# Patient Record
Sex: FEMALE | Race: BLACK OR AFRICAN AMERICAN | URBAN - METROPOLITAN AREA
[De-identification: names, ages, dates, MRNs, and addresses within clinical notes are randomized per-mention and may not be internally consistent; named-entity substitution may affect disease eponyms.]

---

## 2020-10-02 ENCOUNTER — OFFICE VISIT (OUTPATIENT)
Dept: FAMILY MEDICINE | Facility: CLINIC | Age: 45
End: 2020-10-02
Payer: COMMERCIAL

## 2020-10-02 ENCOUNTER — ANCILLARY PROCEDURE (OUTPATIENT)
Dept: ULTRASOUND IMAGING | Facility: CLINIC | Age: 45
End: 2020-10-02
Attending: PHYSICIAN ASSISTANT
Payer: COMMERCIAL

## 2020-10-02 VITALS
TEMPERATURE: 97.9 F | DIASTOLIC BLOOD PRESSURE: 83 MMHG | HEART RATE: 89 BPM | OXYGEN SATURATION: 100 % | BODY MASS INDEX: 30.24 KG/M2 | SYSTOLIC BLOOD PRESSURE: 125 MMHG | HEIGHT: 59 IN | WEIGHT: 150 LBS

## 2020-10-02 DIAGNOSIS — D64.9 LOW HEMOGLOBIN: ICD-10-CM

## 2020-10-02 DIAGNOSIS — R19.00 PELVIC MASS: ICD-10-CM

## 2020-10-02 DIAGNOSIS — Z12.31 ENCOUNTER FOR SCREENING MAMMOGRAM FOR BREAST CANCER: ICD-10-CM

## 2020-10-02 DIAGNOSIS — Z00.00 ROUTINE GENERAL MEDICAL EXAMINATION AT A HEALTH CARE FACILITY: Primary | ICD-10-CM

## 2020-10-02 DIAGNOSIS — Z71.6 ENCOUNTER FOR SMOKING CESSATION COUNSELING: ICD-10-CM

## 2020-10-02 LAB
ALBUMIN SERPL-MCNC: 3.4 G/DL (ref 3.4–5)
ALP SERPL-CCNC: 84 U/L (ref 40–150)
ALT SERPL W P-5'-P-CCNC: 22 U/L (ref 0–50)
ANION GAP SERPL CALCULATED.3IONS-SCNC: 7 MMOL/L (ref 3–14)
AST SERPL W P-5'-P-CCNC: 14 U/L (ref 0–45)
BILIRUB SERPL-MCNC: 0.4 MG/DL (ref 0.2–1.3)
BUN SERPL-MCNC: 16 MG/DL (ref 7–30)
CALCIUM SERPL-MCNC: 8.7 MG/DL (ref 8.5–10.1)
CHLORIDE SERPL-SCNC: 108 MMOL/L (ref 94–109)
CO2 SERPL-SCNC: 22 MMOL/L (ref 20–32)
CREAT SERPL-MCNC: 0.82 MG/DL (ref 0.52–1.04)
ERYTHROCYTE [DISTWIDTH] IN BLOOD BY AUTOMATED COUNT: 20.8 % (ref 10–15)
FERRITIN SERPL-MCNC: 2 NG/ML (ref 8–252)
FOLATE SERPL-MCNC: 9.5 NG/ML
GFR SERPL CREATININE-BSD FRML MDRD: 86 ML/MIN/{1.73_M2}
GLUCOSE SERPL-MCNC: 75 MG/DL (ref 70–99)
HBA1C MFR BLD: 5.2 % (ref 0–5.6)
HCT VFR BLD AUTO: 28.5 % (ref 35–47)
HGB BLD-MCNC: 7.7 G/DL (ref 11.7–15.7)
MCH RBC QN AUTO: 16.3 PG (ref 26.5–33)
MCHC RBC AUTO-ENTMCNC: 27 G/DL (ref 31.5–36.5)
MCV RBC AUTO: 60 FL (ref 78–100)
PLATELET # BLD AUTO: 419 10E9/L (ref 150–450)
POTASSIUM SERPL-SCNC: 4.2 MMOL/L (ref 3.4–5.3)
PROT SERPL-MCNC: 8 G/DL (ref 6.8–8.8)
RBC # BLD AUTO: 4.72 10E12/L (ref 3.8–5.2)
SODIUM SERPL-SCNC: 137 MMOL/L (ref 133–144)
VIT B12 SERPL-MCNC: 568 PG/ML (ref 193–986)
WBC # BLD AUTO: 8.2 10E9/L (ref 4–11)

## 2020-10-02 PROCEDURE — 82746 ASSAY OF FOLIC ACID SERUM: CPT | Performed by: PHYSICIAN ASSISTANT

## 2020-10-02 PROCEDURE — 99386 PREV VISIT NEW AGE 40-64: CPT | Performed by: PHYSICIAN ASSISTANT

## 2020-10-02 PROCEDURE — 83036 HEMOGLOBIN GLYCOSYLATED A1C: CPT | Performed by: PHYSICIAN ASSISTANT

## 2020-10-02 PROCEDURE — 80053 COMPREHEN METABOLIC PANEL: CPT | Performed by: PHYSICIAN ASSISTANT

## 2020-10-02 PROCEDURE — G0145 SCR C/V CYTO,THINLAYER,RESCR: HCPCS | Performed by: PHYSICIAN ASSISTANT

## 2020-10-02 PROCEDURE — 87624 HPV HI-RISK TYP POOLED RSLT: CPT | Performed by: PHYSICIAN ASSISTANT

## 2020-10-02 PROCEDURE — 82728 ASSAY OF FERRITIN: CPT | Performed by: PHYSICIAN ASSISTANT

## 2020-10-02 PROCEDURE — 36415 COLL VENOUS BLD VENIPUNCTURE: CPT | Performed by: PHYSICIAN ASSISTANT

## 2020-10-02 PROCEDURE — 85027 COMPLETE CBC AUTOMATED: CPT | Performed by: PHYSICIAN ASSISTANT

## 2020-10-02 PROCEDURE — 99214 OFFICE O/P EST MOD 30 MIN: CPT | Mod: 25 | Performed by: PHYSICIAN ASSISTANT

## 2020-10-02 PROCEDURE — 82607 VITAMIN B-12: CPT | Performed by: PHYSICIAN ASSISTANT

## 2020-10-02 ASSESSMENT — ENCOUNTER SYMPTOMS
FEVER: 0
CONSTIPATION: 0
CHILLS: 0
JOINT SWELLING: 0
NERVOUS/ANXIOUS: 1
ARTHRALGIAS: 0
NAUSEA: 0
DIZZINESS: 0
HEMATURIA: 0
HEARTBURN: 0
SHORTNESS OF BREATH: 0
HEADACHES: 0
ABDOMINAL PAIN: 0
DYSURIA: 0
EYE PAIN: 0
FREQUENCY: 0
BREAST MASS: 0
SORE THROAT: 0
DIARRHEA: 0
MYALGIAS: 0
PALPITATIONS: 0
PARESTHESIAS: 0
COUGH: 0
HEMATOCHEZIA: 0
WEAKNESS: 0

## 2020-10-02 ASSESSMENT — MIFFLIN-ST. JEOR: SCORE: 1223.09

## 2020-10-02 NOTE — LETTER
October 14, 2020      Joel Salas  [address] [facility] [address]        Dear Ms.Josue,    We are happy to inform you that your recent Pap smear and Human Papillomavirus (HPV) test results are normal and negative.    It is recommended that you have your next Pap smear and Human Papillomavirus (HPV) test in 5 years. You will also need to return to the clinic every year for an annual wellness visit.    If you have additional questions regarding this result, please contact our office and we will be happy to assist you.      Sincerely,    Your [facility] Care Team

## 2020-10-02 NOTE — PROGRESS NOTES
SUBJECTIVE:   CC: Joel Salas is an 44 year old woman who presents for preventive health visit.     Patient has been advised of split billing requirements and indicates understanding: Yes    Would like to start chantix. Had stopped smoking a number of years ago. Has been smoking since pandemic started due to stress.    Hydrocleansing of colon done recently. The last time she went in - they told her to have a lump in her abdomen evaluated. She does not have pain.    Left ankle medial swelling. No pain.     No sex partners in 3 years.  Still getting periods. Very regular. Has had an abnormal pap smear in the past - has been a number of years.       Today's PHQ-2 Score:   PHQ-2 ( 1999 Pfizer) 10/2/2020   Q1: Little interest or pleasure in doing things 1   Q2: Feeling down, depressed or hopeless 1   PHQ-2 Score 2   Q1: Little interest or pleasure in doing things Several days   Q2: Feeling down, depressed or hopeless Several days   PHQ-2 Score 2       Abuse: Current or Past (Physical, Sexual or Emotional) - No  Do you feel safe in your environment? Yes        Social History     Tobacco Use     Smoking status: Current Every Day Smoker     Types: Cigarettes     Smokeless tobacco: Never Used   Substance Use Topics     Alcohol use: Yes     Comment: 1 drink a week      If you drink alcohol do you typically have >3 drinks per day or >7 drinks per week? No    Alcohol Use 10/2/2020   Prescreen: >3 drinks/day or >7 drinks/week? No   Prescreen: >3 drinks/day or >7 drinks/week? -       Reviewed orders with patient.  Reviewed health maintenance and updated orders accordingly - Yes  BP Readings from Last 3 Encounters:   10/02/20 125/83    Wt Readings from Last 3 Encounters:   10/02/20 68 kg (150 lb)                    Mammogram Screening: Patient under age 50, mutual decision reflected in health maintenance.      Pertinent mammograms are reviewed under the imaging tab.  History of abnormal Pap smear: NO - age 30-65 PAP every 5  "years with negative HPV co-testing recommended     Reviewed and updated as needed this visit by clinical staff  Tobacco  Allergies  Meds  Problems  Med Hx  Surg Hx  Fam Hx  Soc Hx          Reviewed and updated as needed this visit by Provider  Tobacco  Allergies  Meds  Problems  Med Hx  Surg Hx  Fam Hx             Review of Systems   Constitutional: Negative for chills and fever.   HENT: Negative for congestion, ear pain, hearing loss and sore throat.    Eyes: Negative for pain and visual disturbance.   Respiratory: Negative for cough and shortness of breath.    Cardiovascular: Positive for peripheral edema. Negative for chest pain and palpitations.   Gastrointestinal: Negative for abdominal pain, constipation, diarrhea, heartburn, hematochezia and nausea.   Breasts:  Negative for tenderness, breast mass and discharge.   Genitourinary: Negative for dysuria, frequency, genital sores, hematuria, pelvic pain, urgency, vaginal bleeding and vaginal discharge.   Musculoskeletal: Negative for arthralgias, joint swelling and myalgias.   Skin: Negative for rash.   Neurological: Negative for dizziness, weakness, headaches and paresthesias.   Psychiatric/Behavioral: Negative for mood changes. The patient is nervous/anxious.         OBJECTIVE:   /83 (BP Location: Right arm, Patient Position: Sitting, Cuff Size: Adult Regular)   Pulse 89   Temp 97.9  F (36.6  C) (Oral)   Ht 1.486 m (4' 10.5\")   Wt 68 kg (150 lb)   SpO2 100%   BMI 30.82 kg/m    Physical Exam  GENERAL: healthy, alert and no distress  EYES: Eyes grossly normal to inspection, PERRL and conjunctivae and sclerae normal  HENT: ear canals and TM's normal, nose and mouth without ulcers or lesions  NECK: no adenopathy, no asymmetry, masses, or scars and thyroid normal to palpation  RESP: lungs clear to auscultation - no rales, rhonchi or wheezes  BREAST: normal without masses, tenderness or nipple discharge and no palpable axillary masses or " adenopathy  CV: regular rate and rhythm, normal S1 S2, no S3 or S4, no murmur, click or rub, no peripheral edema and peripheral pulses strong  ABDOMEN: soft, nontender, no hepatosplenomegaly, and bowel sounds normal. Large midline pelvic mass noted. Non tender.   (female): normal female external genitalia, normal urethral meatus, vaginal mucosa pink, moist, well rugated.  MS: no gross musculoskeletal defects noted, no edema  SKIN: no suspicious lesions or rashes  NEURO: Normal strength and tone, mentation intact and speech normal  PSYCH: mentation appears normal, affect normal/bright    Diagnostic Test Results:   Labs reviewed in Epic  No results found for this or any previous visit (from the past 24 hour(s)).    ASSESSMENT/PLAN:   1. Routine general medical examination at a health care facility  Well adult. Screening today.  - Comprehensive metabolic panel (BMP + Alb, Alk Phos, ALT, AST, Total. Bili, TP)  - Hemoglobin A1c  - CBC with platelets  - Pap imaged thin layer screen with HPV - recommended age 30 - 65 years (select HPV order below)  - HPV High Risk Types DNA Cervical    2. Encounter for smoking cessation counseling  Would like to stop smoking. She would like to try chantix. Discussed side effects.   - varenicline (CHANTIX [place]) 0.5 MG X 11 & 1 MG X 42 tablet; Take 0.5 mg tab daily for 3 days, THEN 0.5 mg tab twice daily for 4 days, THEN 1 mg twice daily.  Dispense: 53 tablet; Refill: 0    3. Encounter for screening mammogram for breast cancer  Screening.  - *MA Screening Digital Bilateral; Future    4.. Pelvic mass  Patient has abdominal mass - uterine? Would like to evaluate further with US. Patient agrees to this.   - US Pelvic Complete with Transvaginal; Future    5. Low hemoglobin  Critical hemoglobin level of 7.7 discovered before patient left the clinic. Was able to discuss this further with her. She has not had a previous anemia diagnosis that she is aware of. She has no symptoms - but does state  "she likes to eat ice. No bleeding that she is aware of. Will urgently get the US of the pelvic/uterine mass. Will also obtain a few more labs (folate, vit B12, ferritin). Uterine fibroid vs other pelvic abnormality possibly contributing. Discussed critical level with patient and urgency of obtaining necessary imaging and labs to determine cause. She will proceed to the ED if she develops additional symptoms including lightheadedness, change in vaginal bleeding, shortness of breath, new severe abdominal pain. She verbalizes understanding.    Patient has been advised of split billing requirements and indicates understanding: Yes  COUNSELING:  Special attention given to:        Regular exercise       Healthy diet/nutrition       Safe sex practices/STD prevention    Estimated body mass index is 30.82 kg/m  as calculated from the following:    Height as of this encounter: 1.486 m (4' 10.5\").    Weight as of this encounter: 68 kg (150 lb).    Weight management plan: Discussed healthy diet and exercise guidelines    She reports that she has been smoking cigarettes. She has never used smokeless tobacco.      Counseling Resources:  ATP IV Guidelines  Pooled Cohorts Equation Calculator  Breast Cancer Risk Calculator  BRCA-Related Cancer Risk Assessment: FHS-7 Tool  FRAX Risk Assessment  ICSI Preventive Guidelines  Dietary Guidelines for Americans, 2010  USDA's MyPlate  ASA Prophylaxis  Lung CA Screening    Annika Queen PA-C  [facility]    "

## 2020-10-03 PROBLEM — D64.9 LOW HEMOGLOBIN: Status: ACTIVE | Noted: 2020-10-03

## 2020-10-05 DIAGNOSIS — D64.9 LOW HEMOGLOBIN: ICD-10-CM

## 2020-10-05 DIAGNOSIS — D25.0 SUBMUCOUS LEIOMYOMA OF UTERUS: Primary | ICD-10-CM

## 2020-10-05 NOTE — PROGRESS NOTES
Talked with patient. Lost connection twice.     Take oral iron. Try taking with Vitamin C (orange juice) as this can help absorption.     Recheck hemoglobin in 6 weeks. Make lab appointment.     Annika Queen PA-C

## 2020-10-07 LAB
COPATH REPORT: NORMAL
PAP: NORMAL

## 2020-10-08 ENCOUNTER — TELEPHONE (OUTPATIENT)
Dept: FAMILY MEDICINE | Facility: CLINIC | Age: 45
End: 2020-10-08

## 2020-10-08 NOTE — TELEPHONE ENCOUNTER
Spoke with patient and she stated she wanted to schedule with obgyn. MATT transferred patient to [place]. MATT Silva

## 2020-10-08 NOTE — TELEPHONE ENCOUNTER
Reason for Call:  Other call back    Detailed comments: Patient calling wanting to discuss scheduling an ultrasound. Please call patient to discuss.     Phone Number Patient can be reached at: Home number on file [phone] (home)    Best Time: Anytime    Can we leave a detailed message on this number? YES    Call taken on 10/8/2020 at 12:09 PM by Maryse Rodriguez

## 2020-10-09 LAB
FINAL DIAGNOSIS: NORMAL
HPV HR 12 DNA CVX QL NAA+PROBE: NEGATIVE
HPV16 DNA SPEC QL NAA+PROBE: NEGATIVE
HPV18 DNA SPEC QL NAA+PROBE: NEGATIVE
SPECIMEN DESCRIPTION: NORMAL
SPECIMEN SOURCE CVX/VAG CYTO: NORMAL

## 2020-11-09 ENCOUNTER — OFFICE VISIT (OUTPATIENT)
Dept: OBGYN | Facility: CLINIC | Age: 45
End: 2020-11-09
Payer: COMMERCIAL

## 2020-11-09 ENCOUNTER — TELEPHONE (OUTPATIENT)
Dept: RADIOLOGY | Facility: CLINIC | Age: 45
End: 2020-11-09

## 2020-11-09 VITALS
DIASTOLIC BLOOD PRESSURE: 88 MMHG | SYSTOLIC BLOOD PRESSURE: 132 MMHG | WEIGHT: 154 LBS | HEART RATE: 106 BPM | TEMPERATURE: 98.5 F | BODY MASS INDEX: 31.64 KG/M2

## 2020-11-09 DIAGNOSIS — D25.0 SUBMUCOUS LEIOMYOMA OF UTERUS: Primary | ICD-10-CM

## 2020-11-09 DIAGNOSIS — D64.9 ANEMIA, UNSPECIFIED TYPE: ICD-10-CM

## 2020-11-09 PROCEDURE — 99203 OFFICE O/P NEW LOW 30 MIN: CPT | Performed by: OBSTETRICS & GYNECOLOGY

## 2020-11-09 NOTE — PROGRESS NOTES
GYN Clinic Visit  Date of visit: 2020   Chief Complaint: fibroi    HPI:   Joel Aguero is a 44 year old  female who presents with symptomatic fibroid.    Reports her fibroid uterus was discovered when she went to get hydrocleansing colon therapy treatment, her practitioner noticed a pelvic mass.   Back pain all the time. Thinks it may be worse this week bc she just got a new mattress.  Really bad cramping and pain during menses. Menses are regular, once a month for 3-5 days. Heavy flow and anemia. Taking an iron supplement. Occasional issues with constipation and urinary frequency.    Pelvic ultrasound  10/2/2020  UTERUS: 14.1 x 9.3 x 9.3 cm. A large fundal fibroid obscures the  endometrial stripe but is most likely submucosal in location and  measures 11.1 x 9.4 x 8.2 cm.     ENDOMETRIUM: Obscured by the large fibroid.     RIGHT OVARY: 3.4 x 2.0 x 2.5 cm. Normal with flow demonstrated.     LEFT OVARY: Obscured by the fibroid and/or overlying bowel gas.     No significant free fluid.                                                                      IMPRESSION: Large uterine fibroid measuring 11.1 cm. This is most  likely submucosal in location however the endometrial stripe is not  visualized. Right ovary unremarkable. Left ovary obscured. A follow-up  MRI pelvis could be performed with diffusion imaging to assess for the  possibility of leiomyosarcoma.    Results for JOEL AGUERO (MRN [id number]) as of 2020 12:44   Ref. Range 10/2/2020 12:17   WBC Latest Ref Range: 4.0 - 11.0 10e9/L 8.2   Hemoglobin Latest Ref Range: 11.7 - 15.7 g/dL 7.7 (LL)   Hematocrit Latest Ref Range: 35.0 - 47.0 % 28.5 (L)   Platelet Count Latest Ref Range: 150 - 450 10e9/L 419   RBC Count Latest Ref Range: 3.8 - 5.2 10e12/L 4.72   MCV Latest Ref Range: 78 - 100 fl 60 (L)   MCH Latest Ref Range: 26.5 - 33.0 pg 16.3 (L)   MCHC Latest Ref Range: 31.5 - 36.5 g/dL 27.0 (L)   RDW Latest Ref Range: 10.0 - 15.0 % 20.8 (H)        Obstetric History:   OB History    Para Term  AB Living   1 1 1 0 0 1   SAB TAB Ectopic Multiple Live Births   0 0 0 0 0      # Outcome Date GA Lbr Mir/2nd Weight Sex Delivery Anes PTL Lv   1 Term      Vag-Spont            Gynecologic History:  Menses: occur every 28-30 days lasting 3-5 days in duration, moderate to heavy flow.   STI history: none  Last Pap: 10/2/2020 NIL neg HR HPV  History of abnormal pap: yes, remote past  History of cervical procedures: no   Not currently sexually active x3yrs    Past Medical History:  History reviewed. No pertinent past medical history.    Past Surgical History:  History reviewed. No pertinent surgical history.      Medications:  Current Outpatient Medications   Medication     ferrous sulfate (SLO-FE) 142 (45 Fe) MG CR tablet     varenicline (CHANTIX [place]) 0.5 MG X 11 & 1 MG X 42 tablet     No current facility-administered medications for this visit.        Allergy:  Allergies   Allergen Reactions     Hydrocodone-Acetaminophen Itching     Patient denies food, latex or environmental allergies.     Social History:  Working 3 jobs, trying to buy a house.  Social History     Tobacco Use     Smoking status: Current Every Day Smoker     Types: Cigarettes     Smokeless tobacco: Never Used   Substance Use Topics     Alcohol use: Yes     Comment: 1 drink a week      Drug use: Not Currently        Family History   Adopted: Yes   Problem Relation Age of Onset     Kidney Disease Father         recent kidney transplant, unsure paternity     Diabetes No family hx of      Cancer No family hx of        Review of Systems:  Review of Systems  Gen: no change in weight, no fever, no chills, no fatigue  CV: no palpitations, no chest pain, no hypertension, no syncope  Resp: no shortness of breath, no cough, no wheezing, no asthma  GI: no nausea, no vomiting, no diarrhea, no constipation, no bloating, no GERD  : +heavy menses, no vaginal discharge, no dysuria, no pelvic pain    Endo: no thyroid problems, no cold/heat intolerance, no acne, no hirsutism, no diabetes  Heme: no easy bruising or bleeding, no history of DVT/PE/CVA  Neuro: no headaches, no seizures, no strokes, no focal deficits      Physical Exam:  Vitals:    20 0859   BP: 132/88   Pulse: 106   Temp: 98.5  F (36.9  C)   TempSrc: Oral   Weight: 69.9 kg (154 lb)     Body mass index is 31.64 kg/m .    Gen: healthy, alert, active, no distress  Neck: supple, no adenopathy, normal thyroid  CV: normal pulses, trace lower extremity edema  Resp: normal respiratory effort  Abd: soft, uterus palpable almost up to level of umbilicus, non-tender, non-distended, no scars  Extremities: nontender, no edema  :   - external genitalia: vulva and perineum are normal without lesion, mass or erythema  - urethra: well supported urethra, no hypermobility, normal Skenes and Bartholins.   - bladder: no tenderness, no masses  - vagina: intact, rugated mucosa without lesions or abnormal discharge. noprolapse. .   - uterus: 18wk size enlarged, slightly mobile uterus  - adnexa: unable to clearly assess due to large uterine fibroid  - rectal: deferred      Assessment:  Joel Salas is a 44 year old  with symptomatic uterine fibroid.   Very large uterine fibroid. Discussed fibroids are benign growths of muscle in the uterus. Rarely can be cancer - leiomyosarcoma. An MRI can give more info about that.   Discussed treatment options - abdominal hysterectomy or IR consult for possible uterine fibroid embolization. Discussed risks and recovery for hysterectomy. Recommend endometrial biopsy prior to hysterectomy. Patient strongly wants to avoid surgery, interested in learning if she would be a UFE candidate. Discussed that she would need an MRI and consult with IR.   If IR does not think she is a good candidate or if still having symptoms after UFE discussed that hysterectomy would be next option.    In the meantime recommend she continue taking iron  supplement and recheck hgb next month.     Plan:  1. Submucous leiomyoma of uterus  - IR REFERRAL    2. Anemia, unspecified type  - **Hemoglobin FUTURE anytime; Future      Dolly Linder MD

## 2020-11-09 NOTE — TELEPHONE ENCOUNTER
----- Message from Mukul Velázquez, RN sent at 11/9/2020 12:16 PM CST -----  Regarding: FW: IR REFERRAL  Hello  New ufe consult for mylesveenaricarolin    Date: tues 11/17   At 130pm     See below    See if she can do video visit first and if not then tele visit    Please let pt know appt details    Thanks mukul     ----- Message -----  From: Montse Perez  Sent: 11/9/2020  10:50 AM CST  To: Ramila Taylor, LINDA, Mukul Velázquez, RN, #  Subject: IR REFERRAL                                      IR REFERRAL    Submucous leiomyoma of uterus    Dolly Linder MD       [place]    [phone]

## 2020-11-09 NOTE — PATIENT INSTRUCTIONS
Patient Education     Uterine Fibroid Embolization    Uterine fibroid embolization is a procedure that is done to shrink a fibroid. Fibroids are benign (not cancerous) growths of muscle tissue on or inside the uterus. This procedure stops the fibroid s blood supply. It is often done by a specially trained doctor called an interventional radiologist. This type of doctor is trained and certified by the American Board of Radiology to use minimally invasive image-guided procedures to diagnose and treat diseases.   Before your procedure  To get ready for your procedure:    Follow any directions you re given for not eating or drinking before the procedure.    Tell your healthcare provider if you are allergic to any foods, medicines, or X-ray dye (contrast medium).    Tell your healthcare provider about any recent illnesses or if you have medical conditions  Tell your healthcare provider about any medicines you are taking. You may need to stop taking all or some of these before the procedure. This includes:    All prescription medicines    Herbs, vitamins, and other supplements    Over-the-counter medicines such as aspirin or ibuprofen    Street drugs  During your procedure    You will lie on an X-ray table. An IV line is put into a vein in your arm or hand. This line gives you fluids and medicines. You may be given medicine to relax you and make you sleepy.    Medicine will be put on the skin on your groin to numb it. Then a small cut or incision is made. A needle attached to a thin wire is put through the incision. The wire is put into a blood vessel near your groin.    A thin, flexible tube called a catheter is placed over the guide wire into the blood vessel.    X-ray dye is injected through the catheter. This helps the blood vessels and catheter show up better on X-ray images. The catheter s movement can be seen on a computer screen.    The radiologist uses X-ray images as a guide. He or she moves the catheter  through the blood vessel. It is moved into the artery that supplies blood to your uterus.    The catheter is moved near the fibroid. The radiologist then injects tiny grains of plastic or spongy material into the artery. These grains flow to the smaller blood vessels that supply the fibroid. Blood flow to these vessels is blocked. The procedure is done again on the other side of your uterus.    The entire procedure takes about 1 to 2 hours.  After your procedure  You may stay in the hospital overnight. You will likely feel pain and cramping for up to 1 week. Medicines will be prescribed to help control this pain. Some vaginal bleeding, also called spotting, is common for a few days. You may feel tired and have an upset stomach and a fever for a few days after the procedure. During your recovery, care for the incision as directed. You may be able to return to work 1 to 2 weeks after the procedure. Your healthcare provider will tell you more.  Possible risks and complications  All procedures have some risk. Possible risks of uterine fibroid embolization include:    Infection or bruising around the catheter insertion site    Blood clot in a blood vessel    Problems because of X-ray dye. These include allergic reaction or kidney damage.    Not being able to have a baby (infertility), or going into early menopause.    Injury to the uterus. This might require a procedure to remove tissue from the uterus (dilation and curettage). Or the uterus itself might need to be removed (a hysterectomy).    Exposure to X-ray radiation. This is typically low level and considered safe.    Fibroid symptoms may come back. Or new fibroids can develop in the future.  Experts don't know how this procedure affects a woman's ability to have a baby (fertility) in the long term. They also don't know how it affects future pregnancies.   [employer] last reviewed this educational content on 11/1/2017 2000-2020 [employer] [address]  [place] [address]. All rights reserved. This information is not intended as a substitute for professional medical care. Always follow your healthcare professional's instructions.

## 2020-11-17 ENCOUNTER — VIRTUAL VISIT (OUTPATIENT)
Dept: RADIOLOGY | Facility: CLINIC | Age: 45
End: 2020-11-17
Attending: RADIOLOGY
Payer: COMMERCIAL

## 2020-11-17 ENCOUNTER — PRE VISIT (OUTPATIENT)
Dept: RADIOLOGY | Facility: CLINIC | Age: 45
End: 2020-11-17

## 2020-11-17 DIAGNOSIS — D25.9 FIBROID UTERUS: Primary | ICD-10-CM

## 2020-11-17 PROCEDURE — 99201 PR OFFICE/OUTPT VISIT, NEW, LEVEL I: CPT | Performed by: RADIOLOGY

## 2020-11-17 NOTE — PROGRESS NOTES
"Joel Salas is a 44 year old female who is being evaluated via a billable video visit.      The patient has been notified of following:     \"This video visit will be conducted via a call between you and your physician/provider. We have found that certain health care needs can be provided without the need for an in-person physical exam.  This service lets us provide the care you need with a video conversation.  If a prescription is necessary we can send it directly to your pharmacy.  If lab work is needed we can place an order for that and you can then stop by our lab to have the test done at a later time.    Video visits are billed at different rates depending on your insurance coverage.  Please reach out to your insurance provider with any questions.    If during the course of the call the physician/provider feels a video visit is not appropriate, you will not be charged for this service.\"    Patient has given verbal consent for Video visit? Yes  How would you like to obtain your AVS? MyChart  If you are dropped from the video visit, the video invite should be resent to: Text to cell phone: [phone]  Will anyone else be joining your video visit? No      Video-Visit Details    Type of service:  Video Visit    Video Start Time: 1:30   Video End Time: 1:45    Originating Location (pt. Location): Home    Distant Location (provider location):  [facility]     Platform used for Video Visit: Waze      I have reviewed and updated the patient's allergies and medication list. Patient was asked if they had any patient reported vital signs to present, if yes, please see documented vitals.  Patient was also asked for their current weight and height, if presented, documented in vitals.      Lucrecia Padron CMA        Mrs. Salas is a pleasant 44 year-old female  who is referred by Dr. Linder for evaluation for uterine fibroid embolization.  She has a large submucosal fibroid measuring 11 " cm. This was detected recently and conformed by ultrasound. She doesn't have any major complaint. However, she mentions that she has heavy bleedings for about a day during her periods with cramps.  She has chronic constipation and denies urinary symptoms or bleeding/abdominal pain outside periods.  I looked at her images and the labs. She has low hemoglobin but she says that is running in the family and she is now taking iron.    Current Outpatient Medications   Medication     ferrous sulfate (SLO-FE) 142 (45 Fe) MG CR tablet     varenicline (CHANTIX [place]) 0.5 MG X 11 & 1 MG X 42 tablet     No current facility-administered medications for this visit.      No past medical history on file.    No past surgical history on file.    Family History   Adopted: Yes   Problem Relation Age of Onset     Kidney Disease Father         recent kidney transplant, unsure paternity     Diabetes No family hx of      Cancer No family hx of        Social History     Tobacco Use     Smoking status: Current Every Day Smoker     Types: Cigarettes     Smokeless tobacco: Never Used   Substance Use Topics     Alcohol use: Yes     Comment: 1 drink a week        Impression and plan:    The patient is a 44 year-old female with a large uterine fibroid. She is not very symptomatic but she would like to do something about the fibroid. I reviewed the surgical and endovascular options. She doesn't want any surgery at this point. I discussed the risks and befits of uterine artery embolization. She would like to consider this procedure. I told her that we need an MRI of the pelvis and an endometrial biopsy. She agreed to proceed with those tests. She also confirmed that she is not planning to get pregnant. We will contact her to schedule the MRI, biopsy and based on the results the UFE procedure.

## 2020-11-17 NOTE — LETTER
"    11/17/2020         RE: Joel Salas  [address] [place] [facility] [address]        Dear Colleague,    Thank you for referring your patient, Joel Salas, to the [facility]. Please see a copy of my visit note below.    Joel Salas is a 44 year old female who is being evaluated via a billable video visit.      The patient has been notified of following:     \"This video visit will be conducted via a call between you and your physician/provider. We have found that certain health care needs can be provided without the need for an in-person physical exam.  This service lets us provide the care you need with a video conversation.  If a prescription is necessary we can send it directly to your pharmacy.  If lab work is needed we can place an order for that and you can then stop by our lab to have the test done at a later time.    Video visits are billed at different rates depending on your insurance coverage.  Please reach out to your insurance provider with any questions.    If during the course of the call the physician/provider feels a video visit is not appropriate, you will not be charged for this service.\"    Patient has given verbal consent for Video visit? Yes  How would you like to obtain your AVS? MyChart  If you are dropped from the video visit, the video invite should be resent to: Text to cell phone: [phone]  Will anyone else be joining your video visit? No      Video-Visit Details    Type of service:  Video Visit    Video Start Time: 1:30   Video End Time: 1:45    Originating Location (pt. Location): Home    Distant Location (provider location):  [facility]     Platform used for Video Visit: [employer]      I have reviewed and updated the patient's allergies and medication list. Patient was asked if they had any patient reported vital signs to present, if yes, please see documented vitals.  Patient was also asked for their current weight and " height, if presented, documented in vitals.      Lucrecia Padron CMA        Mrs. Salas is a pleasant 44 year-old female  who is referred by Dr. Linder for evaluation for uterine fibroid embolization.  She has a large submucosal fibroid measuring 11 cm. This was detected recently and conformed by ultrasound. She doesn't have any major complaint. However, she mentions that she has heavy bleedings for about a day during her periods with cramps.  She has chronic constipation and denies urinary symptoms or bleeding/abdominal pain outside periods.  I looked at her images and the labs. She has low hemoglobin but she says that is running in the family and she is now taking iron.    Current Outpatient Medications   Medication     ferrous sulfate (SLO-FE) 142 (45 Fe) MG CR tablet     varenicline (CHANTIX CAROLINA) 0.5 MG X 11 & 1 MG X 42 tablet     No current facility-administered medications for this visit.      No past medical history on file.    No past surgical history on file.    Family History   Adopted: Yes   Problem Relation Age of Onset     Kidney Disease Father         recent kidney transplant, unsure paternity     Diabetes No family hx of      Cancer No family hx of        Social History     Tobacco Use     Smoking status: Current Every Day Smoker     Types: Cigarettes     Smokeless tobacco: Never Used   Substance Use Topics     Alcohol use: Yes     Comment: 1 drink a week        Impression and plan:    The patient is a 44 year-old female with a large uterine fibroid. She is not very symptomatic but she would like to do something about the fibroid. I reviewed the surgical and endovascular options. She doesn't want any surgery at this point. I discussed the risks and befits of uterine artery embolization. She would like to consider this procedure. I told her that we need an MRI of the pelvis and an endometrial biopsy. She agreed to proceed with those tests. She also confirmed that she is not planning to get pregnant.  We will contact her to schedule the MRI, biopsy and based on the results the UFE procedure.          Again, thank you for allowing me to participate in the care of your patient.        Sincerely,        Ambar Barboza MD

## 2020-11-17 NOTE — TELEPHONE ENCOUNTER
DIAGNOSIS: NEW consult annita, shantell Rosado, DOXIMITY   DATE RECEIVED: 11.17.20   NOTES STATUS DETAILS   OFFICE NOTE from referring provider Internal 11.9.20  Dr. Dolly Linder  [facility] [place]   OFFICE NOTE from other specialist Internal 10.2.20  Annika Queen PA-C  [facility]   DISCHARGE SUMMARY from hospital N/A    DISCHARGE REPORT from the ER N/A    OPERATIVE REPORT N/A    MEDICATION LIST Internal    XRAYS (IMAGES & REPORTS) Internal 10.2.20   Pelvic Complete   PATHOLOGY  Slides & report N/A

## 2020-11-18 ENCOUNTER — TELEPHONE (OUTPATIENT)
Dept: OBGYN | Facility: CLINIC | Age: 45
End: 2020-11-18

## 2020-11-18 NOTE — TELEPHONE ENCOUNTER
LVMTCB. See message below, patient needs to schedule EMB with Dr. Linder.   Claribel Kline, Surgery Coordinator

## 2020-11-18 NOTE — TELEPHONE ENCOUNTER
----- Message from Dolly Linder MD sent at 11/18/2020  7:57 AM CST -----  Regarding: FW: patient follow up  Can you call patient and get her scheduled for an endometrial biopsy in clinic (that she will need in advance of her uterine fibroid embolization)?  Thanks,  Dolly Linder MD  ----- Message -----  From: Ashlee Velázquez RN  Sent: 11/17/2020   2:40 PM CST  To: Ambar Barboza MD, #  Subject: patient follow up                                [facility] Dr. Linder,     We [facility] with [facility] and will plan to move forward with treatment.   I am wondering if your office can reach out to pt to schedule the endometrial biopsy.     I can have our schedulers take care of the MRI imaging.     She state that she'd like to wait for the UFE at this time and she will call me when she's ready for treatment, due new insurance pending.     Please let us know if there are any other questions, and Thank you for the referral.     Sincerely,     Ashlee LÓPEZ, RN, BSN  Interventional Radiology/Vascular  Nurse Coordinator   Phone: [phone]  Fax: [phone]

## 2020-12-02 ENCOUNTER — TELEPHONE (OUTPATIENT)
Dept: VASCULAR SURGERY | Facility: CLINIC | Age: 45
End: 2020-12-02

## 2020-12-02 NOTE — TELEPHONE ENCOUNTER
Called pt to fup on her biopsy in which she states that she has this scheduled for tomorrow.    I inquired on when she's like to schedule for her UFE procedure with Dr. Barboza in which she states that she'd like to wait til the first of the New Year.     She is looking at the first week of January.     Will see what I can do and then call her back with appt details. We may need to wait til closer to January also in regards to scheduling.     She agrees to plan     Ashlee LÓPEZ RN, BSN  Interventional Radiology/Vascular  Nurse Coordinator   Phone: [phone]  Fax: [phone]

## 2020-12-22 ENCOUNTER — TELEPHONE (OUTPATIENT)
Dept: VASCULAR SURGERY | Facility: CLINIC | Age: 45
End: 2020-12-22

## 2020-12-22 NOTE — TELEPHONE ENCOUNTER
Called pt to [facility] on her UFE that she wanted to wait til after the New Year.       Informed her that I will send her some dates of his availability for her procedure.     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     I informed her that at least a week off should suffice for optimal recovery.    She will look at dates and then let me know    Ashlee LÓPEZ RN, BSN  Interventional Radiology/Vascular  Nurse Coordinator   Phone: [phone]  Fax: [phone]

## 2021-03-07 ENCOUNTER — HEALTH MAINTENANCE LETTER (OUTPATIENT)
Age: 46
End: 2021-03-07

## 2021-06-28 ENCOUNTER — OFFICE VISIT (OUTPATIENT)
Dept: FAMILY MEDICINE | Facility: CLINIC | Age: 46
End: 2021-06-28
Payer: COMMERCIAL

## 2021-06-28 VITALS
TEMPERATURE: 99 F | BODY MASS INDEX: 31.65 KG/M2 | HEART RATE: 94 BPM | DIASTOLIC BLOOD PRESSURE: 90 MMHG | SYSTOLIC BLOOD PRESSURE: 137 MMHG | WEIGHT: 157 LBS | HEIGHT: 59 IN | OXYGEN SATURATION: 97 %

## 2021-06-28 DIAGNOSIS — F17.200 TOBACCO USE DISORDER: ICD-10-CM

## 2021-06-28 DIAGNOSIS — R60.0 PEDAL EDEMA: Primary | ICD-10-CM

## 2021-06-28 DIAGNOSIS — Z11.59 NEED FOR HEPATITIS C SCREENING TEST: ICD-10-CM

## 2021-06-28 DIAGNOSIS — D25.9 UTERINE LEIOMYOMA, UNSPECIFIED LOCATION: ICD-10-CM

## 2021-06-28 DIAGNOSIS — Z11.4 SCREENING FOR HIV (HUMAN IMMUNODEFICIENCY VIRUS): ICD-10-CM

## 2021-06-28 DIAGNOSIS — Z13.220 SCREENING FOR HYPERLIPIDEMIA: ICD-10-CM

## 2021-06-28 DIAGNOSIS — D64.9 ANEMIA, UNSPECIFIED TYPE: ICD-10-CM

## 2021-06-28 LAB
ALBUMIN SERPL-MCNC: 3.4 G/DL (ref 3.4–5)
ALP SERPL-CCNC: 76 U/L (ref 40–150)
ALT SERPL W P-5'-P-CCNC: 21 U/L (ref 0–50)
ANION GAP SERPL CALCULATED.3IONS-SCNC: 5 MMOL/L (ref 3–14)
AST SERPL W P-5'-P-CCNC: 20 U/L (ref 0–45)
BILIRUB SERPL-MCNC: 0.4 MG/DL (ref 0.2–1.3)
BUN SERPL-MCNC: 10 MG/DL (ref 7–30)
CALCIUM SERPL-MCNC: 8.7 MG/DL (ref 8.5–10.1)
CHLORIDE SERPL-SCNC: 111 MMOL/L (ref 94–109)
CHOLEST SERPL-MCNC: 175 MG/DL
CO2 SERPL-SCNC: 24 MMOL/L (ref 20–32)
CREAT SERPL-MCNC: 0.66 MG/DL (ref 0.52–1.04)
ERYTHROCYTE [DISTWIDTH] IN BLOOD BY AUTOMATED COUNT: 21.1 % (ref 10–15)
GFR SERPL CREATININE-BSD FRML MDRD: >90 ML/MIN/{1.73_M2}
GLUCOSE SERPL-MCNC: 79 MG/DL (ref 70–99)
HCT VFR BLD AUTO: 34.3 % (ref 35–47)
HDLC SERPL-MCNC: 80 MG/DL
HGB BLD-MCNC: 9.8 G/DL (ref 11.7–15.7)
LDLC SERPL CALC-MCNC: 83 MG/DL
MCH RBC QN AUTO: 18.8 PG (ref 26.5–33)
MCHC RBC AUTO-ENTMCNC: 28.6 G/DL (ref 31.5–36.5)
MCV RBC AUTO: 66 FL (ref 78–100)
NONHDLC SERPL-MCNC: 95 MG/DL
PLATELET # BLD AUTO: 439 10E9/L (ref 150–450)
POTASSIUM SERPL-SCNC: 4.3 MMOL/L (ref 3.4–5.3)
PROT SERPL-MCNC: 7.8 G/DL (ref 6.8–8.8)
RBC # BLD AUTO: 5.2 10E12/L (ref 3.8–5.2)
SODIUM SERPL-SCNC: 140 MMOL/L (ref 133–144)
TRIGL SERPL-MCNC: 59 MG/DL
WBC # BLD AUTO: 6.4 10E9/L (ref 4–11)

## 2021-06-28 PROCEDURE — 80053 COMPREHEN METABOLIC PANEL: CPT | Performed by: FAMILY MEDICINE

## 2021-06-28 PROCEDURE — 36415 COLL VENOUS BLD VENIPUNCTURE: CPT | Performed by: FAMILY MEDICINE

## 2021-06-28 PROCEDURE — 86803 HEPATITIS C AB TEST: CPT | Performed by: FAMILY MEDICINE

## 2021-06-28 PROCEDURE — 87389 HIV-1 AG W/HIV-1&-2 AB AG IA: CPT | Performed by: FAMILY MEDICINE

## 2021-06-28 PROCEDURE — 85027 COMPLETE CBC AUTOMATED: CPT | Performed by: FAMILY MEDICINE

## 2021-06-28 PROCEDURE — 99214 OFFICE O/P EST MOD 30 MIN: CPT | Performed by: FAMILY MEDICINE

## 2021-06-28 PROCEDURE — 80061 LIPID PANEL: CPT | Performed by: FAMILY MEDICINE

## 2021-06-28 RX ORDER — BUPROPION HYDROCHLORIDE 150 MG/1
150 TABLET, EXTENDED RELEASE ORAL DAILY
Qty: 60 TABLET | Refills: 2 | Status: SHIPPED | OUTPATIENT
Start: 2021-06-28

## 2021-06-28 ASSESSMENT — MIFFLIN-ST. JEOR: SCORE: 1258.65

## 2021-06-28 NOTE — PROGRESS NOTES
"    Assessment & Plan       ICD-10-CM    1. Pedal edema  R60.0 Comprehensive metabolic panel   2. Anemia, unspecified type  D64.9 CBC with platelets   3. Uterine leiomyoma, unspecified location  D25.9    4. Tobacco use disorder  F17.200 buPROPion (WELLBUTRIN SR) 150 MG 12 hr tablet   5. Screening for HIV (human immunodeficiency virus)  Z11.4 HIV Antigen Antibody Combo   6. Need for hepatitis C screening test  Z11.59 Hepatitis C Screen Reflex to HCV RNA Quant and Genotype   7. Screening for hyperlipidemia  Z13.220 Lipid panel reflex to direct LDL Fasting     I suspect her pedal edema is multifactorial and due to higher than desirable sodium intake, being on her feet all day, being obese, and having a large uterine fibroid which may have a mass-effect and impairs some venous or lymphatic return  We will check some lab work today as above, however, to help rule out other contributing factors  We discussed options for smoking cessation I will prescribe bupropion for her  Depending on her hemoglobin result, she will likely still want to proceed with the embolization procedure for her fibroid she has that contact information at home     Tobacco Cessation:   reports that she has been smoking cigarettes. She has never used smokeless tobacco.  Tobacco Cessation Action Plan: Pharmacotherapies : Zyban/Wellbutrin    BMI:   Estimated body mass index is 31.65 kg/m  as calculated from the following:    Height as of this encounter: 1.5 m (4' 11.06\").    Weight as of this encounter: 71.2 kg (157 lb).   Weight management plan: Discussed healthy diet and exercise guidelines      Return in about 2 months (around 8/28/2021) for a recheck if symptoms worsen or fail to improve.    Cezar Munoz MD  [facility] ALISTAIR Maldonado is a 45 year old who presents for the following health issues     HPI     Concern - Ankles  Onset: 3 months  Description: Swelling and bruising.   Intensity: mild  Progression of " Symptoms:  same  Accompanying Signs & Symptoms: When on her feet at work. 60 hours a week  Previous history of similar problem: No  Precipitating factors:        Worsened by: Being on her feet  Alleviating factors:        Improved by:   Therapies tried and outcome: Changed her shoes a couple of times. Wearing a support sock helps.    She would like labs done for anemia.    She apparently was having a little bit of ankle swelling back in the fall when she was seen by Annika Queen as well.  She had lab work done at that time and her hemoglobin was down to 7.7.  She was found to have a uterine fibroid that was fairly large and she was having heavy bleeding and cramping.  She was seen by radiology and was going to have an interventional radiology procedure of embolization, but then work got busy and her uterine cramping and bleeding subsided significantly, so she still has not had that done.  Her energy level is good.  She stopped taking the iron supplement a while back when her bottle ran out.  She is on her feet a lot during the day.  She works up to 65 hours a week and is on her feet for almost all that time.  She still gets some swelling towards the end of the day around her ankles and she had a little bit of bruising around her left ankle recently.  She works at [employer] for one of her jobs and acknowledges eating a lot of salt there.  No unusual chest pain or shortness of breath.  She is a smoker.  She is only smoking 2 cigarettes a day, but she would like to quit.  She had tried Chantix in the past, but there is a shortage of that now, so she'd be open to trying bupropion.  She would like to take something to help her self quit.    Patient Active Problem List   Diagnosis     Low hemoglobin     Current Outpatient Medications   Medication     ferrous sulfate (SLO-FE) 142 (45 Fe) MG CR tablet     varenicline (CHANTIX CAROLINA) 0.5 MG X 11 & 1 MG X 42 tablet     No current facility-administered medications for  "this visit.          Review of Systems   Constitutional, HEENT, cardiovascular, pulmonary, gi and gu systems are negative, except as otherwise noted.      Objective    BP (!) 137/90 (BP Location: Left arm, Patient Position: Sitting, Cuff Size: Adult Regular)   Pulse 94   Temp 99  F (37.2  C) (Oral)   Ht 1.5 m (4' 11.06\")   Wt 71.2 kg (157 lb)   SpO2 97%   BMI 31.65 kg/m    Body mass index is 31.65 kg/m .  Physical Exam   GENERAL: alert, no distress and over weight  RESP: lungs clear to auscultation - no rales, rhonchi or wheezes  CV: regular rate and rhythm, normal S1 S2, no S3 or S4, no murmur, click or rub, and peripheral pulses intact  EXT: She has trace pedal edema on the right and trace to 1+ on the left    Previous lab results from last fall were reviewed as well as various office notes from her PCP and the IR provider        "

## 2021-06-29 ENCOUNTER — TELEPHONE (OUTPATIENT)
Dept: FAMILY MEDICINE | Facility: CLINIC | Age: 46
End: 2021-06-29

## 2021-06-29 LAB
HCV AB SERPL QL IA: NONREACTIVE
HIV 1+2 AB+HIV1 P24 AG SERPL QL IA: NONREACTIVE

## 2021-06-29 NOTE — RESULT ENCOUNTER NOTE
Meca,  Your hemoglobin is up to 9.8 now, so not as low as it was previously, but still on the low side.  It would still be prudent to follow through with the procedure for uterine fibroid and it would also be advisable to continue with an iron supplement at least once a day.  The rest of your lab work looks good including liver and kidney tests, cholesterol values, blood sugar, and screening test for hepatitis C and HIV.    Cezar Munoz MD

## 2021-06-29 NOTE — TELEPHONE ENCOUNTER
Patient called and stated that someone called her and told her to call the call center. No message found. Patient notified recent refills sent to pharmacy on 06/28/2021. It appears patient has some labs resulted and pending, but no information. Patient says she will wait for a call back and says that she will allow detailed voicemail at phone number [phone].   Cassandra Land RN on 6/29/2021 at 10:20 AM

## 2021-06-30 NOTE — TELEPHONE ENCOUNTER
Pt was given provider's message regarding test results:   Cezar Munoz MD   6/29/2021 11:59 AM CDT      Meca,  Your hemoglobin is up to 9.8 now, so not as low as it was previously, but still on the low side.  It would still be prudent to follow through with the procedure for uterine fibroid and it would also be advisable to continue with an iron supplement at least once a day.  The rest of your lab work looks good including liver and kidney tests, cholesterol values, blood sugar, and screening test for hepatitis C and HIV.     Cezar Munoz MD     Pt verbalized understanding and had no further questions or concerns.    Mallika Adames RN  [facility]

## 2021-10-11 ENCOUNTER — HEALTH MAINTENANCE LETTER (OUTPATIENT)
Age: 46
End: 2021-10-11

## 2021-12-05 ENCOUNTER — HEALTH MAINTENANCE LETTER (OUTPATIENT)
Age: 46
End: 2021-12-05

## 2022-01-15 ENCOUNTER — OFFICE VISIT (OUTPATIENT)
Dept: URGENT CARE | Facility: URGENT CARE | Age: 47
End: 2022-01-15

## 2022-01-15 VITALS
TEMPERATURE: 98.6 F | BODY MASS INDEX: 31.81 KG/M2 | WEIGHT: 157.8 LBS | HEART RATE: 98 BPM | DIASTOLIC BLOOD PRESSURE: 94 MMHG | OXYGEN SATURATION: 99 % | SYSTOLIC BLOOD PRESSURE: 154 MMHG

## 2022-01-15 DIAGNOSIS — T24.112A SUPERFICIAL BURN OF LEFT THIGH, INITIAL ENCOUNTER: ICD-10-CM

## 2022-01-15 DIAGNOSIS — T23.162A SUPERFICIAL BURN OF BACK OF LEFT HAND, INITIAL ENCOUNTER: Primary | ICD-10-CM

## 2022-01-15 PROCEDURE — 99213 OFFICE O/P EST LOW 20 MIN: CPT | Performed by: PHYSICIAN ASSISTANT

## 2022-01-15 RX ORDER — SILVER SULFADIAZINE 10 MG/G
CREAM TOPICAL 2 TIMES DAILY
Qty: 25 G | Refills: 0 | Status: SHIPPED | OUTPATIENT
Start: 2022-01-15 | End: 2022-01-25
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
## 2022-01-15 NOTE — LETTER
[facility] [address] [address]  Phone: [phone]    January 15, 2022        Joel Salas  [address] [place] [address]          To whom it may concern:    RE: Joel Salas    Patient was seen and treated today at our clinic. Please excuse from work today.    Please contact me for questions or concerns.      Sincerely,        Heather Wagner PA-C

## 2022-03-27 ENCOUNTER — HEALTH MAINTENANCE LETTER (OUTPATIENT)
Age: 47
End: 2022-03-27

## 2022-09-24 ENCOUNTER — HEALTH MAINTENANCE LETTER (OUTPATIENT)
Age: 47
End: 2022-09-24

## 2023-01-29 ENCOUNTER — HEALTH MAINTENANCE LETTER (OUTPATIENT)
Age: 48
End: 2023-01-29

## 2023-05-08 ENCOUNTER — HEALTH MAINTENANCE LETTER (OUTPATIENT)
Age: 48
End: 2023-05-08

## 2024-02-25 ENCOUNTER — HEALTH MAINTENANCE LETTER (OUTPATIENT)
Age: 49
End: 2024-02-25